# Patient Record
(demographics unavailable — no encounter records)

---

## 2018-01-01 NOTE — HP
Information from Mother's Record: 





 Previous Pregnancy/Births











Maternal Age                   40


 


Grav                           3


 


Para                           1


 


SAB                            0


 


IEA                            1


 


LC                             1


 


Maternal Blood Type and Rh     A Positive








 Testing Needs/Results











Gestational Age in Weeks and   37 Weeks and 0 Days





Days                           


 


Determined By                  LMP


 


Violence or Abuse During this  No





Pregnancy                      


 


Feeding Plan                   Breast


 


Planned Infant Care Provider   [facility]





Post-Discharge                 


 


Serology/RPR Result            Non-Reactive


 


Rubella Result                 Immune


 


HBsAg Result                   Negative


 


HIV Result                     Negative


 


GBS Culture Result             Negative











 Significant Medical History











Hx Diabetes                    No


 


Hx Thyroid Disease             No


 


Hx Hypertension                No


 


Hx Depression                  Yes


 


Hx Asthma                      No


 


Hx  Section            No


 


Hx Other Reproductive          Yes: Hx IEA 47 XYY





Disorders/Problems             


 


Other Pertinent Medical        Anxiety





History                        








 Tobacco/Alcohol/Substance Use











Smoking Status (MU)            Former Smoker


 


Have You Smoked in the Last    No





Year                           


 


Household Exposure             No


 


Alcohol Use                    None


 


Substance Use Type             None








 Delivery Information/Events of Note











Date of Birth [A]              18


 


Time of Birth [A]              06:44


 


Delivery Method [A]            Spontaneous Vaginal


 


Labor [A]                      Spontaneous


 


Did Patient attempt ? [A]  N/A, No Previous C-Sectio


 


Amniotic Fluid [A]             Clear


 


Anesthesia/Analgesia [A]       None


 


Level of Nursery               Regular/Bedside


 


Delivery Events of Note        None Apply

















Delivery Events


Date of Birth: 18


Time of Birth: 06:44


Apgar Score 1 Minute: 9


Apgar Score 5 Minutes: 9


Gestational Age Weeks: 37


Gestational Age Days: 0


Delivery Type: Vaginal


Amniotic Fluid: Clear


Intrapartal Antibiotics Indicated: None Apply


Other GBS Status Detail: GBS Negative This Pregnancy


ROM Length: ROM < 18 Hours


Antibiotic Treatment: No Antibx, or ANY Antibx Given < 2hrs Prior to Delivery


Hepatitis B Vaccine: Given Within 12 Hours


Immunoglobulin Given: No


 Drug Withdrawal Risk: None Apply


Hepatitis B Status/Risk: Mother HBsAg NEGATIVE With No New Risk Factors


Maternal Consent: Mother CONSENTS To Infant Hepatitis Vaccine +/- HBIG





Hypoglycemia Assessment


Hypoglycemia Risk - High: None


Hypoglycemia Symptoms: None





Nutrition and Output





- Nutrition


Method of Feeding: Breast feeding


Feeding Frequency: Ad Smiley





- Stool


Stool Passed: No





- Voiding


Voiding: No





Measurements


Current Weight: 3.377 kg


Birth Weight: 3.377 kg


Birthweight in lbs and ozs: 7 lbs and 7 oz


Length: 19 in


Head Circumference in inches: 14


Abdominal Girth in cm: 32


Abdominal Girth in inches: 12.598





Vitals


Vital Signs: 


 Vital Signs











  18





  07:10 07:37 08:37


 


Temperature 98.7 F 98.5 F 99.2 F


 


Pulse Rate 136 128 124


 


Respiratory 44 48 40





Rate   














  18





  09:37


 


Temperature 98.1 F


 


Pulse Rate 152


 


Respiratory 40





Rate 














 Physical Exam


General Appearance: Alert, Active


Skin Color: Normal


Level of Distress: No Distress


Nutritional Status: AGA


Cranial Features: Normal head shape, Symmetric facial features, Normal 

fontanelles


Ears: Symmetrical, Normal Position, Canals Patent


Oropharynx: Normal: Lips, Mouth, Gums


Neck: Normal Tone


Respiratory Effort: Normal


Respiratory Rate: Normal


Chest Appearance: Normal, Areola Breast 3-4 mm Size, Symmetrical


Auscultation: Bilateral Good Air Exchange


Breath Sounds: NL Both Lungs


Location of Apical Pulse: Normal


Rhythm: Regular


Heart Sounds: Normal: S1, S2


Abnormal Heart Sounds: No Murmurs, No S3, No S4


Femoral Pulses: Bilateral Normal


Umbilicus Assessment: Yes Normal


Abdomen: Normal


Abdomen Palpation: Liver Normal, Spleen Normal


Hernia: None


Anus: Patent


Location of Anus: Normal


Genital Appearance: Male


Enlarged Nodes: None


Penis: Normal


Meatal Location: Tip of Glans


Scrotal Skin: Rugae Normal for GA


Scrotal Mass: Bilateral None


Testes: Bilateral Normal


Clavicles: Normal


Arms: 2 Symmetrical Extremities, Full Range of Motion


Hands: 2 Hands, Symmetrical, 5 Fingers on Each Hand, Full Range of Motion


Left Hip: Normal ROM


Right Hip: Normal ROM


Legs: 2 Symmetrical Extremities, Full Range of Motion


Feet: 2 Feet, Symmetrical, Creases on 2/3 of Soles, Full Range of Motion


Spine: Normal


Skin Texture: Smooth, Soft


Skin Appearance: No Abnormalities


Neuro: Normal: Francisco, Sucking, Muscle Tone


Cranial Nerve Exam: Cranial N. II-XII Normal





Medications


Home Medications: 


 Home Medications











 Medication  Instructions  Recorded  Confirmed  Type


 


NK [No Home Medications Reported]  18 History











Inpatient Medications: 


 Medications





Dextrose (Glutose Oral Nicu*)  0 ml BUCCAL .SEE MD INSTRUCTIONS PRN; Protocol


   PRN Reason: ASYMTOMATIC HYPOGLYCEMIA











Assessment





- Status


Status: Full-term, AGA


Condition: Stable


Assessment: 





Early term AGA male born to a 41 y/o ->2 A+/GBS-/PNL- mother via  at 37 0

/7 weeks. Baby born about 1 hr prior to exam. Initially went to breast well; 

mother breast fed sibling until age 12 months. Baby has not yet voided or 

stooled. Normal exam. Hep B vaccine was given.





Needs red reflex checked prior to discharge. 





Plan of Care


[facility]


Plan of Care: 





Routine  care


Lactation assistance as needed


Provided Guidance to: Mother, Father


Guidance and Instruction: feeding schedule/plan

## 2018-01-01 NOTE — DS
Prenatal Information: 





 Previous Pregnancy/Births











Maternal Age                   40


 


Grav                           3


 


Para                           1


 


SAB                            0


 


IEA                            1


 


LC                             1


 


Maternal Blood Type and Rh     A Positive








 Testing Needs/Results











Gestational Age   37 Weeks and 0 Days


 


Determined By                  LMP


 


Feeding Plan                   Breast


 


Planned Infant Care Provider   [facility]


 


Serology/RPR Result            Non-Reactive


 


Rubella Result                 Immune


 


HBsAg Result                   Negative


 


HIV Result                     Negative


 


GBS Culture Result             Negative











 Significant Medical History











Hx Depression                  Yes


 


Hx Other Reproductive          Yes: Hx IEA 47 XYY





Disorders/Problems             


 


Other Pertinent Medical        Anxiety





History                        








 Tobacco/Alcohol/Substance Use











Smoking Status (MU)            Former Smoker


 


Have You Smoked in the Last    No





Year                           


 


Household Exposure             No


 


Alcohol Use                    None


 


Substance Use Type             None








 Delivery Information/Events of Note











Date of Birth [A]              18


 


Time of Birth [A]              06:44


 


Delivery Method [A]            Spontaneous Vaginal


 


Amniotic Fluid [A]             Clear


 


Anesthesia/Analgesia [A]       None


 


Level of Nursery               Regular/Bedside


 


Delivery Events of Note        None Apply

















Delivery Events


Date of Birth: 18


Time of Birth: 06:44


Apgar Score 1 Minute: 9


Apgar Score 5 Minutes: 9


Gestational Age Weeks: 37


Gestational Age Days: 0


Delivery Type: Vaginal


Amniotic Fluid: Clear


Intrapartal Antibiotics Indicated: None Apply


Other GBS Status Detail: GBS Negative This Pregnancy


ROM Length: ROM < 18 Hours


Antibiotic Treatment: No Antibx, or ANY Antibx Given < 2hrs Prior to Delivery


 Drug Withdrawal Risk: None Apply


Hepatitis B Status/Risk: Mother HBsAg NEGATIVE With No New Risk Factors


Interval History: 





Stable overnight, mother reports nursing well with no significant nipple 

discomfort


Stools in Past 24 Hours: 5


Times Voided in Past 24 Hours: 3





Measurements


Current Weight: 3.23 kg


Weight in lbs and ozs: 7 lbs and 2 oz


Weight Yesterday: 3.377 kg


Weight Gain/Loss Since Last Weight In Grams: 147.0 Loss


Birth Weight: 3.377 kg


Birthweight in lbs and ozs: 7 lbs and 7 oz


% Weight Gain/Loss from Birth Weight: 4% Loss


Length: 48.26 cm


Head Circumference in inches: 14


Abdominal Girth in cm: 32


Abdominal Girth in inches: 12.598





Vitals


Vital Signs: 











  18





  08:37 09:37 10:37


 


Temperature 99.2 F 98.1 F 98.4 F


 


Pulse Rate 124 152 140


 


Respiratory 40 40 44





Rate   














  18





  11:45 12:15 16:25


 


Temperature 99.3 F 99.9 F 99.3 F


 


Pulse Rate 136 130 130


 


Respiratory 36 40 40





Rate   














  01/14/18 01/15/18 01/15/18





  19:45 01:14 04:22


 


Temperature 99.6 F 98.5 F 99.6 F


 


Pulse Rate 130 150 120


 


Respiratory 44 40 52





Rate   














  01/15/18





  07:59


 


Temperature 99 F


 


Pulse Rate 144


 


Respiratory 42





Rate 














[facility] Physical Exam


General Appearance: Alert, Active


Skin Color: Normal


Level of Distress: No Distress


Neck: Normal Tone


Respiratory Effort: Normal


Respiratory Rate: Normal


Auscultation: Bilateral Good Air Exchange


Breath Sounds: NL Both Lungs


Rhythm: Regular


Abnormal Heart Sounds: No Murmurs, No S3, No S4


Umbilicus Assessment: Yes Normal


Abdomen: Normal


Abdomen Palpation: Liver Normal, Spleen Normal


Penis: Normal


Clavicles: Normal


Left Hip: Normal ROM


Right Hip: Normal ROM


Skin Texture: Smooth, Soft


Skin Appearance: No Abnormalities


Neuro: Normal: Mathews, Sucking, Muscle Tone


Cranial Nerve Exam: Cranial N. II-XII Normal





Medications


Home Medications: 


 Home Medications











 Medication  Instructions  Recorded  Confirmed  Type


 


NK [No Home Medications Reported]  18 History











Inpatient Medications: 


 Medications





Dextrose (Glutose Oral Nicu*)  0 ml BUCCAL .SEE MD INSTRUCTIONS PRN; Protocol


   PRN Reason: ASYMTOMATIC HYPOGLYCEMIA











Results/Investigations


Major Jaundice Risk Factors: None


Minor Jaundice Risk Factors: GA 37-38 wks, Breastfeeding, Male, Mother > 24 yrs 

old


Lab Results: 


 











  18





  06:44


 


RPR  Nonreactive














Hospital Course


Date Given: 18





Assessment





- Assessment


Condition at Discharge: Stable


Diagnosis at Discharge: Healthy early term .  TcBili and hearing screen 

pending.  Unable to visualize red reflexes prior to discharge.





Plan





- Follow Up Care


Follow Up Care Provider: [facility]


Follow up date: 18


Appointment Status: Office Will Call





- Anticipatory Guidance/Instruction


Provided Guidance to: Mother, Father


Guidance and Instruction: feeding schedule/plan, contact physician on call